# Patient Record
Sex: FEMALE | Race: WHITE | Employment: UNEMPLOYED | URBAN - METROPOLITAN AREA
[De-identification: names, ages, dates, MRNs, and addresses within clinical notes are randomized per-mention and may not be internally consistent; named-entity substitution may affect disease eponyms.]

---

## 2021-11-06 ENCOUNTER — APPOINTMENT (OUTPATIENT)
Dept: CT IMAGING | Age: 31
End: 2021-11-06
Attending: EMERGENCY MEDICINE
Payer: COMMERCIAL

## 2021-11-06 ENCOUNTER — HOSPITAL ENCOUNTER (EMERGENCY)
Age: 31
Discharge: HOME | End: 2021-11-06
Attending: EMERGENCY MEDICINE
Payer: COMMERCIAL

## 2021-11-06 VITALS
RESPIRATION RATE: 24 BRPM | HEART RATE: 80 BPM | SYSTOLIC BLOOD PRESSURE: 120 MMHG | DIASTOLIC BLOOD PRESSURE: 76 MMHG | TEMPERATURE: 98.4 F | OXYGEN SATURATION: 96 %

## 2021-11-06 DIAGNOSIS — F41.1 ANXIETY STATE: Primary | ICD-10-CM

## 2021-11-06 DIAGNOSIS — F41.0 PANIC ATTACK: ICD-10-CM

## 2021-11-06 LAB
ALBUMIN SERPL-MCNC: 3.4 G/DL (ref 3.5–5)
ALBUMIN/GLOB SERPL: 0.9 {RATIO} (ref 1.1–2.2)
ALP SERPL-CCNC: 74 U/L (ref 45–117)
ALT SERPL-CCNC: 24 U/L (ref 12–78)
ANION GAP SERPL CALC-SCNC: 10 MMOL/L (ref 5–15)
APPEARANCE UR: CLEAR
AST SERPL-CCNC: 19 U/L (ref 15–37)
BACTERIA URNS QL MICRO: NEGATIVE /HPF
BASOPHILS # BLD: 0.1 K/UL (ref 0–0.1)
BASOPHILS NFR BLD: 1 % (ref 0–1)
BILIRUB SERPL-MCNC: 0.2 MG/DL (ref 0.2–1)
BILIRUB UR QL: NEGATIVE
BUN SERPL-MCNC: 8 MG/DL (ref 6–20)
BUN/CREAT SERPL: 10 (ref 12–20)
CALCIUM SERPL-MCNC: 9.4 MG/DL (ref 8.5–10.1)
CHLORIDE SERPL-SCNC: 108 MMOL/L (ref 97–108)
CO2 SERPL-SCNC: 21 MMOL/L (ref 21–32)
COLOR UR: NORMAL
COMMENT, HOLDF: NORMAL
COVID-19 RAPID TEST, COVR: NOT DETECTED
CREAT SERPL-MCNC: 0.83 MG/DL (ref 0.55–1.02)
CRP SERPL-MCNC: 0.42 MG/DL (ref 0–0.6)
DIFFERENTIAL METHOD BLD: NORMAL
EOSINOPHIL # BLD: 0.4 K/UL (ref 0–0.4)
EOSINOPHIL NFR BLD: 4 % (ref 0–7)
EPITH CASTS URNS QL MICRO: NORMAL /LPF
ERYTHROCYTE [DISTWIDTH] IN BLOOD BY AUTOMATED COUNT: 12.4 % (ref 11.5–14.5)
FERRITIN SERPL-MCNC: 20 NG/ML (ref 26–388)
FIBRINOGEN PPP-MCNC: 389 MG/DL (ref 200–475)
GLOBULIN SER CALC-MCNC: 3.9 G/DL (ref 2–4)
GLUCOSE SERPL-MCNC: 105 MG/DL (ref 65–100)
GLUCOSE UR STRIP.AUTO-MCNC: NEGATIVE MG/DL
HCG SERPL QL: NEGATIVE
HCT VFR BLD AUTO: 38.9 % (ref 35–47)
HGB BLD-MCNC: 13.3 G/DL (ref 11.5–16)
HGB UR QL STRIP: NEGATIVE
HYALINE CASTS URNS QL MICRO: NORMAL /LPF (ref 0–5)
IMM GRANULOCYTES # BLD AUTO: 0 K/UL (ref 0–0.04)
IMM GRANULOCYTES NFR BLD AUTO: 0 % (ref 0–0.5)
KETONES UR QL STRIP.AUTO: NEGATIVE MG/DL
LACTATE SERPL-SCNC: 2 MMOL/L (ref 0.4–2)
LDH SERPL L TO P-CCNC: 192 U/L (ref 81–246)
LEUKOCYTE ESTERASE UR QL STRIP.AUTO: NEGATIVE
LYMPHOCYTES # BLD: 1.3 K/UL (ref 0.8–3.5)
LYMPHOCYTES NFR BLD: 14 % (ref 12–49)
MAGNESIUM SERPL-MCNC: 1.9 MG/DL (ref 1.6–2.4)
MCH RBC QN AUTO: 30.7 PG (ref 26–34)
MCHC RBC AUTO-ENTMCNC: 34.2 G/DL (ref 30–36.5)
MCV RBC AUTO: 89.8 FL (ref 80–99)
MONOCYTES # BLD: 0.7 K/UL (ref 0–1)
MONOCYTES NFR BLD: 7 % (ref 5–13)
NEUTS SEG # BLD: 7.4 K/UL (ref 1.8–8)
NEUTS SEG NFR BLD: 74 % (ref 32–75)
NITRITE UR QL STRIP.AUTO: NEGATIVE
NRBC # BLD: 0 K/UL (ref 0–0.01)
NRBC BLD-RTO: 0 PER 100 WBC
PH UR STRIP: 7 [PH] (ref 5–8)
PLATELET # BLD AUTO: 314 K/UL (ref 150–400)
PMV BLD AUTO: 11.6 FL (ref 8.9–12.9)
POTASSIUM SERPL-SCNC: 3.6 MMOL/L (ref 3.5–5.1)
PROCALCITONIN SERPL-MCNC: <0.05 NG/ML
PROT SERPL-MCNC: 7.3 G/DL (ref 6.4–8.2)
PROT UR STRIP-MCNC: NEGATIVE MG/DL
RBC # BLD AUTO: 4.33 M/UL (ref 3.8–5.2)
RBC #/AREA URNS HPF: NORMAL /HPF (ref 0–5)
SAMPLES BEING HELD,HOLD: NORMAL
SODIUM SERPL-SCNC: 139 MMOL/L (ref 136–145)
SOURCE, COVRS: NORMAL
SP GR UR REFRACTOMETRY: 1 (ref 1–1.03)
TSH SERPL DL<=0.05 MIU/L-ACNC: 1.53 UIU/ML (ref 0.36–3.74)
UR CULT HOLD, URHOLD: NORMAL
UROBILINOGEN UR QL STRIP.AUTO: 0.2 EU/DL (ref 0.2–1)
WBC # BLD AUTO: 9.8 K/UL (ref 3.6–11)
WBC URNS QL MICRO: NORMAL /HPF (ref 0–4)

## 2021-11-06 PROCEDURE — 84145 PROCALCITONIN (PCT): CPT

## 2021-11-06 PROCEDURE — 74011000636 HC RX REV CODE- 636: Performed by: RADIOLOGY

## 2021-11-06 PROCEDURE — 82728 ASSAY OF FERRITIN: CPT

## 2021-11-06 PROCEDURE — 83735 ASSAY OF MAGNESIUM: CPT

## 2021-11-06 PROCEDURE — 87635 SARS-COV-2 COVID-19 AMP PRB: CPT

## 2021-11-06 PROCEDURE — 87040 BLOOD CULTURE FOR BACTERIA: CPT

## 2021-11-06 PROCEDURE — 86140 C-REACTIVE PROTEIN: CPT

## 2021-11-06 PROCEDURE — 84443 ASSAY THYROID STIM HORMONE: CPT

## 2021-11-06 PROCEDURE — 85384 FIBRINOGEN ACTIVITY: CPT

## 2021-11-06 PROCEDURE — 83615 LACTATE (LD) (LDH) ENZYME: CPT

## 2021-11-06 PROCEDURE — 99285 EMERGENCY DEPT VISIT HI MDM: CPT

## 2021-11-06 PROCEDURE — 83605 ASSAY OF LACTIC ACID: CPT

## 2021-11-06 PROCEDURE — 81001 URINALYSIS AUTO W/SCOPE: CPT

## 2021-11-06 PROCEDURE — 71275 CT ANGIOGRAPHY CHEST: CPT

## 2021-11-06 PROCEDURE — 85025 COMPLETE CBC W/AUTO DIFF WBC: CPT

## 2021-11-06 PROCEDURE — 74011250636 HC RX REV CODE- 250/636: Performed by: EMERGENCY MEDICINE

## 2021-11-06 PROCEDURE — 93005 ELECTROCARDIOGRAM TRACING: CPT

## 2021-11-06 PROCEDURE — 36415 COLL VENOUS BLD VENIPUNCTURE: CPT

## 2021-11-06 PROCEDURE — 80053 COMPREHEN METABOLIC PANEL: CPT

## 2021-11-06 PROCEDURE — 96374 THER/PROPH/DIAG INJ IV PUSH: CPT

## 2021-11-06 PROCEDURE — 84703 CHORIONIC GONADOTROPIN ASSAY: CPT

## 2021-11-06 PROCEDURE — 74011250637 HC RX REV CODE- 250/637: Performed by: EMERGENCY MEDICINE

## 2021-11-06 RX ORDER — ALPRAZOLAM 0.5 MG/1
0.5 TABLET ORAL
Qty: 8 TABLET | Refills: 0 | Status: SHIPPED | OUTPATIENT
Start: 2021-11-06

## 2021-11-06 RX ORDER — ALPRAZOLAM 0.25 MG/1
0.25 TABLET ORAL ONCE
Status: COMPLETED | OUTPATIENT
Start: 2021-11-06 | End: 2021-11-06

## 2021-11-06 RX ORDER — LORAZEPAM 2 MG/ML
1 INJECTION INTRAMUSCULAR
Status: COMPLETED | OUTPATIENT
Start: 2021-11-06 | End: 2021-11-06

## 2021-11-06 RX ORDER — MINERAL OIL
180 ENEMA (ML) RECTAL DAILY
COMMUNITY

## 2021-11-06 RX ORDER — KETOROLAC TROMETHAMINE 30 MG/ML
15 INJECTION, SOLUTION INTRAMUSCULAR; INTRAVENOUS
Status: DISCONTINUED | OUTPATIENT
Start: 2021-11-06 | End: 2021-11-06 | Stop reason: HOSPADM

## 2021-11-06 RX ORDER — DIPHENHYDRAMINE HYDROCHLORIDE 50 MG/ML
25 INJECTION, SOLUTION INTRAMUSCULAR; INTRAVENOUS ONCE
Status: COMPLETED | OUTPATIENT
Start: 2021-11-06 | End: 2021-11-06

## 2021-11-06 RX ORDER — FLUOXETINE HYDROCHLORIDE 40 MG/1
80 CAPSULE ORAL DAILY
COMMUNITY

## 2021-11-06 RX ADMIN — ALPRAZOLAM 0.25 MG: 0.25 TABLET ORAL at 10:16

## 2021-11-06 RX ADMIN — DIPHENHYDRAMINE HYDROCHLORIDE 25 MG: 50 INJECTION, SOLUTION INTRAMUSCULAR; INTRAVENOUS at 12:58

## 2021-11-06 RX ADMIN — IOPAMIDOL 80 ML: 755 INJECTION, SOLUTION INTRAVENOUS at 15:15

## 2021-11-06 RX ADMIN — LORAZEPAM 1 MG: 2 INJECTION INTRAMUSCULAR; INTRAVENOUS at 14:27

## 2021-11-06 RX ADMIN — ALPRAZOLAM 0.25 MG: 0.25 TABLET ORAL at 12:07

## 2021-11-06 NOTE — ED NOTES
Pt and family verbalizing concern about CT and not wanting to proceed with scan and want to go home with medications, MD retana

## 2021-11-06 NOTE — ED PROVIDER NOTES
Patient is a 35-year-old female who presents the emergency department company by her mother for evaluation of tremulousness, shortness of breath and feeling of anxiety. She states that she has had similar episodes in the past associated with hormone changes. She states that she been started on a birth control about 2 weeks ago and then stopped it thinking that maybe was making her feel bad. Denies prior history of PE or DVT. Patient does states she is fully vaccinated and has not been around anyone that she thinks may have gotten her sick. Denies any history of asthma. She does feel like she has been wheezy however. Does note occasionally coughing up yellowish sputum. Patient notes having an 25month-old child at home. Notes stressors related to having a young child however denies any major changes recently. Denies any suicidal or homicidal ideation. No past medical history on file. No past surgical history on file. No family history on file. Social History     Socioeconomic History    Marital status: Not on file     Spouse name: Not on file    Number of children: Not on file    Years of education: Not on file    Highest education level: Not on file   Occupational History    Not on file   Tobacco Use    Smoking status: Not on file    Smokeless tobacco: Not on file   Substance and Sexual Activity    Alcohol use: Not on file    Drug use: Not on file    Sexual activity: Not on file   Other Topics Concern    Not on file   Social History Narrative    Not on file     Social Determinants of Health     Financial Resource Strain:     Difficulty of Paying Living Expenses: Not on file   Food Insecurity:     Worried About Running Out of Food in the Last Year: Not on file    Alondra of Food in the Last Year: Not on file   Transportation Needs:     Lack of Transportation (Medical): Not on file    Lack of Transportation (Non-Medical):  Not on file   Physical Activity:     Days of Exercise per Week: Not on file    Minutes of Exercise per Session: Not on file   Stress:     Feeling of Stress : Not on file   Social Connections:     Frequency of Communication with Friends and Family: Not on file    Frequency of Social Gatherings with Friends and Family: Not on file    Attends Buddhism Services: Not on file    Active Member of [address] [place] [address] [employer] or Organizations: Not on file    Attends Club or Organization Meetings: Not on file    Marital Status: Not on file   Intimate Partner Violence:     Fear of Current or Ex-Partner: Not on file    Emotionally Abused: Not on file    Physically Abused: Not on file    Sexually Abused: Not on file   Housing Stability:     Unable to Pay for Housing in the Last Year: Not on file    Number of [place] in the Last Year: Not on file    Unstable Housing in the Last Year: Not on file         ALLERGIES: Patient has no allergy information on record. Review of Systems   Constitutional: Positive for activity change, chills and fatigue. HENT: Negative for drooling and trouble swallowing. Respiratory: Positive for shortness of breath and wheezing. Negative for choking and stridor. Cardiovascular: Negative for leg swelling. Gastrointestinal: Negative for abdominal pain. Musculoskeletal: Negative for neck pain. Skin: Negative for rash. Allergic/Immunologic: Negative for immunocompromised state. Neurological: Negative for seizures, syncope and light-headedness. Hematological: Does not bruise/bleed easily. Psychiatric/Behavioral: The patient is nervous/anxious. Vitals:    11/06/21 0948   BP: 138/76   Pulse: (!) 111   Resp: 24   Temp: 98.4 °F (36.9 °C)   SpO2: 98%            Physical Exam  Vitals and nursing note reviewed. Constitutional:       General: She is in acute distress. Appearance: She is not diaphoretic. HENT:      Head: Normocephalic and atraumatic. Neck:      Trachea: No tracheal deviation.    Cardiovascular:      Rate and Rhythm: Regular rhythm. Tachycardia present. Pulmonary:      Effort: Pulmonary effort is normal.      Breath sounds: No stridor. Examination of the left-upper field reveals wheezing. Examination of the left-middle field reveals wheezing. Examination of the left-lower field reveals wheezing. Wheezing present. Chest:      Chest wall: No deformity or crepitus. Musculoskeletal:      Cervical back: Neck supple. Right lower leg: No tenderness. No edema. Left lower leg: No tenderness. No edema. Skin:     General: Skin is warm and dry. Neurological:      Mental Status: She is alert and oriented to person, place, and time. Psychiatric:         Mood and Affect: Mood is anxious. [facility]  ED Course as of 11/06/21 1118   Sat Nov 06, 2021   1115 EKG obtained at 1043 showing sinus rhythm, rate 88, normal axis, prolonged QT, no prior EKG available for comparison. [JE]      ED Course User Index  [JE] Nisreen Hays MD       Procedures    3:07 PM  Change of shift. Care of patient signed over to Dr. Rhina Gaming. Bedside handoff complete. Awaiting CTA.

## 2021-11-06 NOTE — ED NOTES
Care assumed from Dr. Kevin Prado at 3 PM.  Please see her note for full H&P.  68-year-old female with severe anxiety, chest tightness and SIB. Labs returned reassuringly showing no significant abnormalities. CTA chest pending at time of signout and returned showing no acute abnormalities. Feel that symptoms are all very much anxiety and panic attack associated. She has psychiatry appointment scheduled on Monday and Rx'd few anxiety medications to take as needed in the meantime. Return precautions were given for worsening or concerns with specific emphasis placed on developing SI or HI. This plan was discussed with the patient at the bedside she stated both understanding and agreement.

## 2021-11-06 NOTE — DISCHARGE INSTRUCTIONS
Follow closely with your provider on Monday and return to the emergency department for any new or worsening symptoms.

## 2021-11-06 NOTE — ED TRIAGE NOTES
Pt states she is having shakiness, shortness of breath for several days. Pt reports she has had these symptoms before and there were related to exhaustion and hormones. Pt reports increased depression lately without SI or HI. She was started on new BCP recently and she took for 1 week and she started feeling bad so she stopped after the first week ( 2 days ago).

## 2021-11-07 LAB
ATRIAL RATE: 88 BPM
CALCULATED P AXIS, ECG09: 60 DEGREES
CALCULATED R AXIS, ECG10: 52 DEGREES
CALCULATED T AXIS, ECG11: 46 DEGREES
DIAGNOSIS, 93000: NORMAL
P-R INTERVAL, ECG05: 138 MS
Q-T INTERVAL, ECG07: 402 MS
QRS DURATION, ECG06: 80 MS
QTC CALCULATION (BEZET), ECG08: 486 MS
VENTRICULAR RATE, ECG03: 88 BPM

## 2021-11-08 ENCOUNTER — HOSPITAL ENCOUNTER (EMERGENCY)
Age: 31
Discharge: HOME | End: 2021-11-08
Attending: EMERGENCY MEDICINE
Payer: COMMERCIAL

## 2021-11-08 VITALS
SYSTOLIC BLOOD PRESSURE: 108 MMHG | RESPIRATION RATE: 20 BRPM | WEIGHT: 169.09 LBS | BODY MASS INDEX: 27.18 KG/M2 | HEART RATE: 110 BPM | TEMPERATURE: 97.1 F | HEIGHT: 66 IN | DIASTOLIC BLOOD PRESSURE: 65 MMHG | OXYGEN SATURATION: 97 %

## 2021-11-08 DIAGNOSIS — F41.1 ANXIETY STATE: Primary | ICD-10-CM

## 2021-11-08 PROCEDURE — 74011250636 HC RX REV CODE- 250/636: Performed by: EMERGENCY MEDICINE

## 2021-11-08 PROCEDURE — 96372 THER/PROPH/DIAG INJ SC/IM: CPT

## 2021-11-08 PROCEDURE — 99283 EMERGENCY DEPT VISIT LOW MDM: CPT

## 2021-11-08 PROCEDURE — 90791 PSYCH DIAGNOSTIC EVALUATION: CPT

## 2021-11-08 RX ORDER — LORAZEPAM 2 MG/ML
1 INJECTION INTRAMUSCULAR ONCE
Status: COMPLETED | OUTPATIENT
Start: 2021-11-08 | End: 2021-11-08

## 2021-11-08 RX ADMIN — LORAZEPAM 1 MG: 2 INJECTION INTRAMUSCULAR; INTRAVENOUS at 11:22

## 2021-11-08 NOTE — ED PROVIDER NOTES
Please note that this dictation was completed with [employer], the computer voice recognition software.  Quite often unanticipated grammatical, syntax, homophones, and other interpretive errors are inadvertently transcribed by the computer software.  Please disregard these errors.  Please excuse any errors that have escaped final proofreading. Patient is a 31 yo F with hx of anxiety, depression, presenting to ED for evaluation of worsening feelings of panic and anxiety over the past week. She states that she takes Prozac and Wellbutrin daily, but recently started a new birth control medication. She does have an outpatient appointment with her psychiatrist today at [facility] but states \"I just cannot wait until then\". She has been taking Xanax which she was prescribed in this ED several days ago, took 2 mg Xanax prior to arrival without relief. She states \"I just feel like I don't want to live anymore if I still feel like this\". She reports history of self-harm, denies any recently. Denies any prior suicide attempt or specific plan to harm herself. No past medical history on file. No past surgical history on file. No family history on file.     Social History     Socioeconomic History    Marital status:      Spouse name: Not on file    Number of children: Not on file    Years of education: Not on file    Highest education level: Not on file   Occupational History    Not on file   Tobacco Use    Smoking status: Not on file    Smokeless tobacco: Not on file   Substance and Sexual Activity    Alcohol use: Not on file    Drug use: Not on file    Sexual activity: Not on file   Other Topics Concern    Not on file   Social History Narrative    Not on file     Social Determinants of Health     Financial Resource Strain:     Difficulty of Paying Living Expenses: Not on file   Food Insecurity:     Worried About [address] in the Last Year: Not on file    [address] in the Last Year: Not on file   Transportation Needs:     Lack of Transportation (Medical): Not on file    Lack of Transportation (Non-Medical): Not on file   Physical Activity:     Days of Exercise per Week: Not on file    Minutes of Exercise per Session: Not on file   Stress:     Feeling of Stress : Not on file   Social Connections:     Frequency of Communication with Friends and Family: Not on file    Frequency of Social Gatherings with Friends and Family: Not on file    Attends Zoroastrian Services: Not on file    Active Member of [address] [place] [address] [employer] or Organizations: Not on file    Attends Club or Organization Meetings: Not on file    Marital Status: Not on file   Intimate Partner Violence:     Fear of Current or Ex-Partner: Not on file    Emotionally Abused: Not on file    Physically Abused: Not on file    Sexually Abused: Not on file   Housing Stability:     Unable to Pay for Housing in the Last Year: Not on file    Number of [place] in the Last Year: Not on file    Unstable Housing in the Last Year: Not on file         ALLERGIES: Patient has no known allergies. Review of Systems   Constitutional: Negative for chills and fever. HENT: Negative for ear pain and sore throat. Eyes: Negative for visual disturbance. Respiratory: Negative for cough and shortness of breath. Cardiovascular: Negative for chest pain. Gastrointestinal: Negative for abdominal pain, diarrhea, nausea and vomiting. Genitourinary: Negative for flank pain. Musculoskeletal: Negative for back pain. Skin: Negative for color change. Neurological: Negative for dizziness and headaches. Psychiatric/Behavioral: Positive for suicidal ideas. Negative for confusion and self-injury. The patient is nervous/anxious.         Vitals:    11/08/21 0846   BP: 108/65   Pulse: (!) 110   Resp: 20   Temp: 97.1 °F (36.2 °C)   SpO2: 97%   Weight: 76.7 kg (169 lb 1.5 oz)   Height: 5' 6\" (1.676 m)            Physical Exam  Vitals and nursing note reviewed. Constitutional:       General: She is not in acute distress. Appearance: Normal appearance. She is not ill-appearing. HENT:      Head: Normocephalic and atraumatic. Mouth/Throat:      Pharynx: Oropharynx is clear. Eyes:      Extraocular Movements: Extraocular movements intact. Conjunctiva/sclera: Conjunctivae normal.   Cardiovascular:      Rate and Rhythm: Normal rate and regular rhythm. Pulmonary:      Effort: Pulmonary effort is normal.      Breath sounds: Normal breath sounds. Abdominal:      Palpations: Abdomen is soft. Tenderness: There is no abdominal tenderness. Musculoskeletal:         General: Normal range of motion. Cervical back: No rigidity. Skin:     General: Skin is warm and dry. Neurological:      General: No focal deficit present. Mental Status: She is alert and oriented to person, place, and time. Psychiatric:         Mood and Affect: Mood is anxious. Affect is tearful. Speech: Speech normal.         Behavior: Behavior is cooperative. Thought Content: Thought content includes suicidal ideation. Thought content does not include homicidal ideation. Thought content does not include homicidal or suicidal plan. MDM  Number of Diagnoses or Management Options  Anxiety state  Diagnosis management comments: Patient is alert, afebrile, mildly tachycardic, remainder of vitals stable. Pt was seen here 11/6 for similar symptoms, had a full medical work-up, and discharged with prn Xanax. Returns for worsening symptoms and suicidal thoughts without plan. Mother and sitter at bedside. Bsmart to see patient. Bsmart has evaluated patient, not meeting inpatient criteria at this time. Patient given one-time dose of benzo with relief in her symptoms. Plan to follow-up with her outpatient psychiatrist at appointment later this afternoon, mother is driving patient home and will stay with her until her appointment.   Return precautions outlined. All questions answered at this time. Amount and/or Complexity of Data Reviewed  Discuss the patient with other providers: yes (Discussed patient with ED attending Ella Carson MD who agrees with current management plan.   )         Pt has been reevaluated. There are no new complaints, changes, or physical findings at this time. All results have been reviewed with patient and/or family. Medications have been reviewed w/ pt and/or family. Pt and/or family's questions have been answered. Pt and/or family expressed good understanding of the dx/tx/rx and is in agreement with plan of care. Pt instructed and agreed to f/u w/ psychiatry and to return to ED upon further deterioration. Pt is ready for discharge. IMPRESSION:  1. Anxiety state        PLAN:  1. Discharge Medication List as of 11/8/2021 11:57 AM        2.    Follow-up Information     Follow up With Specialties Details Why Contact Info    Your psychiatrist ([facility])  Go today As scheduled     [facility] EMERGENCY DEPT Emergency Medicine Go to  If symptoms worsen [address] [facility]  [phone]            Return to ED if worse          Procedures

## 2021-11-08 NOTE — ED TRIAGE NOTES
Patient ambulatory to triage with c/o anxiety, panic attack. Reports feeling elevated heart rate and sensation of \"skin crawling. \"    Was seen at [facility] two days ago, and discharged. Was prescribed oral xanax, stating having no relief from medication. Current episode present x 5 days. Has appointment with psychiatrist at [facility] this afternoon. Patient reports intermittent thoughts of self harm, but denies current plan or thoughts of killing self, or any recent attempt. States hx of self harm, with last attempt \"more than 2 years ago. \"

## 2021-11-08 NOTE — BSMART NOTE
Bsmart has spoken to ED provider regarding patient. Bsmart will assess patient once a previous assessment has been completed. Provider agrees with plan of care.
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
## 2021-11-08 NOTE — ED NOTES
MD Omega Green reviewed discharge instructions with the patient and parent. The patient and parent verbalized understanding. Pt confirmed understanding of need for follow up with primary care provider. Important sections of discharge instructions highlighted for patient. Pt is not in any current distress and shows no evidence of clinical instability. Pt is hemodynamically/respiratorily stable. Paperwork given by provider and reviewed with patient and their mother, opportunity for questions/clarification given. Pt was given work note if applicable/requested. Pt denies any additional complaints. Pt walked out of ED.     Patient Vitals for the past 4 hrs:   Temp Pulse Resp BP SpO2   11/08/21 0846 97.1 °F (36.2 °C) (!) 110 20 108/65 97 %         Franny Bustos RN

## 2021-11-11 LAB
BACTERIA SPEC CULT: NORMAL
SERVICE CMNT-IMP: NORMAL

## 2023-05-23 RX ORDER — FEXOFENADINE HCL 180 MG/1
180 TABLET ORAL DAILY
COMMUNITY

## 2023-05-23 RX ORDER — ALPRAZOLAM 0.5 MG/1
0.5 TABLET ORAL EVERY 8 HOURS PRN
COMMUNITY
Start: 2021-11-06

## 2023-05-23 RX ORDER — FLUOXETINE HYDROCHLORIDE 40 MG/1
80 CAPSULE ORAL DAILY
COMMUNITY